# Patient Record
Sex: MALE | Race: WHITE | NOT HISPANIC OR LATINO | URBAN - METROPOLITAN AREA
[De-identification: names, ages, dates, MRNs, and addresses within clinical notes are randomized per-mention and may not be internally consistent; named-entity substitution may affect disease eponyms.]

---

## 2020-06-10 ENCOUNTER — EMERGENCY (EMERGENCY)
Facility: HOSPITAL | Age: 50
LOS: 0 days | Discharge: HOME | End: 2020-06-11
Attending: EMERGENCY MEDICINE
Payer: SELF-PAY

## 2020-06-10 PROCEDURE — 99053 MED SERV 10PM-8AM 24 HR FAC: CPT

## 2020-06-10 PROCEDURE — 29125 APPL SHORT ARM SPLINT STATIC: CPT

## 2020-06-10 PROCEDURE — 99284 EMERGENCY DEPT VISIT MOD MDM: CPT | Mod: 25

## 2020-06-11 VITALS
WEIGHT: 250 LBS | HEART RATE: 90 BPM | RESPIRATION RATE: 19 BRPM | HEIGHT: 66 IN | SYSTOLIC BLOOD PRESSURE: 153 MMHG | TEMPERATURE: 98 F | DIASTOLIC BLOOD PRESSURE: 113 MMHG

## 2020-06-11 VITALS
OXYGEN SATURATION: 97 % | HEART RATE: 75 BPM | SYSTOLIC BLOOD PRESSURE: 151 MMHG | RESPIRATION RATE: 19 BRPM | DIASTOLIC BLOOD PRESSURE: 82 MMHG | TEMPERATURE: 98 F

## 2020-06-11 LAB
ANION GAP SERPL CALC-SCNC: 7 MMOL/L — SIGNIFICANT CHANGE UP (ref 5–17)
APTT BLD: 32.4 SEC — SIGNIFICANT CHANGE UP (ref 27.5–36.3)
BASOPHILS # BLD AUTO: 0.01 K/UL — SIGNIFICANT CHANGE UP (ref 0–0.2)
BASOPHILS NFR BLD AUTO: 0.1 % — SIGNIFICANT CHANGE UP (ref 0–2)
BUN SERPL-MCNC: 11 MG/DL — SIGNIFICANT CHANGE UP (ref 7–23)
CALCIUM SERPL-MCNC: 8.7 MG/DL — SIGNIFICANT CHANGE UP (ref 8.5–10.1)
CHLORIDE SERPL-SCNC: 106 MMOL/L — SIGNIFICANT CHANGE UP (ref 96–108)
CO2 SERPL-SCNC: 26 MMOL/L — SIGNIFICANT CHANGE UP (ref 22–31)
CREAT SERPL-MCNC: 0.78 MG/DL — SIGNIFICANT CHANGE UP (ref 0.5–1.3)
EOSINOPHIL # BLD AUTO: 0.07 K/UL — SIGNIFICANT CHANGE UP (ref 0–0.5)
EOSINOPHIL NFR BLD AUTO: 0.8 % — SIGNIFICANT CHANGE UP (ref 0–6)
GLUCOSE SERPL-MCNC: 111 MG/DL — HIGH (ref 70–99)
HCT VFR BLD CALC: 45.1 % — SIGNIFICANT CHANGE UP (ref 39–50)
HGB BLD-MCNC: 15.3 G/DL — SIGNIFICANT CHANGE UP (ref 13–17)
IMM GRANULOCYTES NFR BLD AUTO: 0.3 % — SIGNIFICANT CHANGE UP (ref 0–1.5)
INR BLD: 1.09 RATIO — SIGNIFICANT CHANGE UP (ref 0.88–1.16)
LYMPHOCYTES # BLD AUTO: 1.32 K/UL — SIGNIFICANT CHANGE UP (ref 1–3.3)
LYMPHOCYTES # BLD AUTO: 15 % — SIGNIFICANT CHANGE UP (ref 13–44)
MCHC RBC-ENTMCNC: 31.1 PG — SIGNIFICANT CHANGE UP (ref 27–34)
MCHC RBC-ENTMCNC: 33.9 GM/DL — SIGNIFICANT CHANGE UP (ref 32–36)
MCV RBC AUTO: 91.7 FL — SIGNIFICANT CHANGE UP (ref 80–100)
MONOCYTES # BLD AUTO: 0.68 K/UL — SIGNIFICANT CHANGE UP (ref 0–0.9)
MONOCYTES NFR BLD AUTO: 7.7 % — SIGNIFICANT CHANGE UP (ref 2–14)
NEUTROPHILS # BLD AUTO: 6.71 K/UL — SIGNIFICANT CHANGE UP (ref 1.8–7.4)
NEUTROPHILS NFR BLD AUTO: 76.1 % — SIGNIFICANT CHANGE UP (ref 43–77)
NRBC # BLD: 0 /100 WBCS — SIGNIFICANT CHANGE UP (ref 0–0)
PLATELET # BLD AUTO: 239 K/UL — SIGNIFICANT CHANGE UP (ref 150–400)
POTASSIUM SERPL-MCNC: 3.6 MMOL/L — SIGNIFICANT CHANGE UP (ref 3.5–5.3)
POTASSIUM SERPL-SCNC: 3.6 MMOL/L — SIGNIFICANT CHANGE UP (ref 3.5–5.3)
PROTHROM AB SERPL-ACNC: 12.1 SEC — SIGNIFICANT CHANGE UP (ref 10–12.9)
RBC # BLD: 4.92 M/UL — SIGNIFICANT CHANGE UP (ref 4.2–5.8)
RBC # FLD: 13.7 % — SIGNIFICANT CHANGE UP (ref 10.3–14.5)
SODIUM SERPL-SCNC: 139 MMOL/L — SIGNIFICANT CHANGE UP (ref 135–145)
WBC # BLD: 8.82 K/UL — SIGNIFICANT CHANGE UP (ref 3.8–10.5)
WBC # FLD AUTO: 8.82 K/UL — SIGNIFICANT CHANGE UP (ref 3.8–10.5)

## 2020-06-11 PROCEDURE — 73562 X-RAY EXAM OF KNEE 3: CPT | Mod: 26,LT

## 2020-06-11 PROCEDURE — 73140 X-RAY EXAM OF FINGER(S): CPT | Mod: 26,RT

## 2020-06-11 RX ORDER — TETANUS TOXOID, REDUCED DIPHTHERIA TOXOID AND ACELLULAR PERTUSSIS VACCINE, ADSORBED 5; 2.5; 8; 8; 2.5 [IU]/.5ML; [IU]/.5ML; UG/.5ML; UG/.5ML; UG/.5ML
0.5 SUSPENSION INTRAMUSCULAR ONCE
Refills: 0 | Status: COMPLETED | OUTPATIENT
Start: 2020-06-11 | End: 2020-06-11

## 2020-06-11 RX ORDER — AMPICILLIN SODIUM AND SULBACTAM SODIUM 250; 125 MG/ML; MG/ML
3 INJECTION, POWDER, FOR SUSPENSION INTRAMUSCULAR; INTRAVENOUS ONCE
Refills: 0 | Status: COMPLETED | OUTPATIENT
Start: 2020-06-11 | End: 2020-06-11

## 2020-06-11 RX ORDER — OXYCODONE AND ACETAMINOPHEN 5; 325 MG/1; MG/1
1 TABLET ORAL ONCE
Refills: 0 | Status: DISCONTINUED | OUTPATIENT
Start: 2020-06-11 | End: 2020-06-11

## 2020-06-11 RX ADMIN — OXYCODONE AND ACETAMINOPHEN 1 TABLET(S): 5; 325 TABLET ORAL at 00:55

## 2020-06-11 RX ADMIN — AMPICILLIN SODIUM AND SULBACTAM SODIUM 200 GRAM(S): 250; 125 INJECTION, POWDER, FOR SUSPENSION INTRAMUSCULAR; INTRAVENOUS at 03:02

## 2020-06-11 RX ADMIN — TETANUS TOXOID, REDUCED DIPHTHERIA TOXOID AND ACELLULAR PERTUSSIS VACCINE, ADSORBED 0.5 MILLILITER(S): 5; 2.5; 8; 8; 2.5 SUSPENSION INTRAMUSCULAR at 00:54

## 2020-06-11 NOTE — ED ADULT NURSE NOTE - CAS EDN DISCHARGE ASSESSMENT
Dressing clean and dry/Symptoms improved/Alert and oriented to person, place and time/Neuro vascular intact post splinting

## 2020-06-11 NOTE — ED PROVIDER NOTE - PROGRESS NOTE DETAILS
Discussed w Dr. Duke, rec IV abx & will come see pt in am Notified unable to see pt in ED, rec splinting & outpt follow up.

## 2020-06-11 NOTE — ED PROVIDER NOTE - PATIENT PORTAL LINK FT
You can access the FollowMyHealth Patient Portal offered by [facility] by registering at the following website: [email or web address]. By joining [facility]’s FollowMyHealth portal, you will also be able to view your health information using other applications (apps) compatible with our system.

## 2020-06-11 NOTE — ED PROVIDER NOTE - OBJECTIVE STATEMENT
Pertinent PMH/PSH/FHx/SHx and Review of Systems contained within:    48yo M w no significant PMH presents to ED for eval of pain to his R index finger s/p fall.  Pt states he was moving a coffee table when he tripped and fell.  Pt states his R index finger was "crooked" and he landed on his L knee & L forearm.  Denies head trauma, LOC, neck pain, CP, SOB, dizziness.  Pt states his last tetanus shot about 20yrs ago.    No fever/chills, No photophobia/eye pain/changes in vision, No ear pain/sore throat/dysphagia, No chest pain/palpitations, no SOB/cough/wheeze/stridor, No abdominal pain, No neck/back pain, no rash, no changes in neurological status/function. Pertinent PMH/PSH/FHx/SHx and Review of Systems contained within:    48yo M w no significant PMH presents to ED for eval of pain to his R index finger s/p fall.  Pt states he was moving a coffee table when he tripped and fell.  Pt states his R index finger was "crooked" and he landed on his L knee & L forearm.  Denies head trauma, LOC, neck pain, CP, SOB, dizziness.  Pt states his last tetanus shot about 20yrs ago.  Pt is R handed.    No fever/chills, No photophobia/eye pain/changes in vision, No ear pain/sore throat/dysphagia, No chest pain/palpitations, no SOB/cough/wheeze/stridor, No abdominal pain, No neck/back pain, no rash, no changes in neurological status/function.

## 2020-06-11 NOTE — ED PROVIDER NOTE - PHYSICAL EXAMINATION
Gen: Alert, c/o pain  Head: NC, AT, EOMI, normal lids/conjunctiva  ENT: normal hearing, patent oropharynx, MMM  Neck: supple, no tenderness/meningismus/JVD, Trachea midline  Pulm: Bilateral clear BS, normal resp effort, no wheeze/stridor/retractions  CV: RRR, no M/R/G, +dist pulses  Abd: soft, NT/ND, +BS, no guarding/rebound tenderness  Mskel: no edema/erythema/cyanosis  Skin: no rash  Neuro: AAOx3, no sensory/motor deficits, CN 2-12 intact Gen: Alert, c/o pain  Head: NC, AT, EOMI, normal lids/conjunctiva  ENT: normal hearing, patent oropharynx, MMM  Neck: supple, no tenderness/meningismus/JVD, Trachea midline  Pulm: Bilateral clear BS, normal resp effort, no wheeze/stridor/retractions  CV: RRR, no M/R/G, +dist pulses  Abd: soft, NT/ND, +BS, no guarding/rebound tenderness  Mskel: R index finger swollen from MCP joint to PIP joint, + <1cm superficial laceration to dorsum of R index finger between PIP & MCP joint, sensation intact, cap refill <2sec, pt able to flex & extend at PIP joint, limited ROM at MCP joint due to pain, radial pulse +2; +erythema & contusion to L forearm, FROM in L forearm, radial pulse +2, sensation intact; +mild swelling of anterior medial knee, FROM, steady gait  Skin: no rash  Neuro: no sensory/motor deficits

## 2020-06-11 NOTE — ED ADULT NURSE REASSESSMENT NOTE - NS ED NURSE REASSESS COMMENT FT1
Pt with + fx, ERMD at bedside to dress wound. MD consulting with specialist, awaiting further orders

## 2020-06-11 NOTE — ED PROVIDER NOTE - NSFOLLOWUPINSTRUCTIONS_ED_ALL_ED_FT
please follow up with Dr. Duke    take antibiotics    take pain medication only when needed, be careful, it can make you dizzy and constipated    return to hospital if you feel worse

## 2020-06-11 NOTE — ED PROVIDER NOTE - NS HIV RISK FACTOR YES
H&P reviewed  After examining the patient I find no changes in the patients condition since the H&P had been written  Declined

## 2020-06-11 NOTE — ED PROVIDER NOTE - CARE PROVIDER_API CALL
Chriss Duke  PLASTIC SURGERY  [address] [place] [phone]  Phone: [phone]  Fax: [phone]  Follow Up Time:

## 2020-06-11 NOTE — ED PROVIDER NOTE - CLINICAL SUMMARY MEDICAL DECISION MAKING FREE TEXT BOX
Pt w fx of R index finger, pain improved w med given in ED.  Consulted hand, rec splinting & outpt follow up.  Splint placed, pt tolerated procedure well, remained NV intact.  Pt given first dose of abx in ED.  Dc home to  jacqueline Duke, instructed to return to ED if sx worsen.

## 2020-06-11 NOTE — ED ADULT NURSE REASSESSMENT NOTE - NS ED NURSE REASSESS COMMENT FT1
PIV dc'd after unasyn dose. right hand casted and acewrapped by provider, +CMST's to distal exposed digits. Provider reviewed dc instructions including need for f/u with hand specialist

## 2020-06-11 NOTE — ED PROVIDER NOTE - CARE PLAN
Principal Discharge DX:	Closed displaced fracture of proximal phalanx of right index finger, initial encounter

## 2020-06-11 NOTE — ED ADULT NURSE REASSESSMENT NOTE - NS ED NURSE REASSESS COMMENT FT1
Pt presents with right 2nd digit pain and swelling. Pt states he tripped and fell, abraision to left FA, landed on right hand splaying 2nd digit out. No LOC, no head injury. ERMD at bedside will follow
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
## 2020-06-12 DIAGNOSIS — S62.610A DISPLACED FRACTURE OF PROXIMAL PHALANX OF RIGHT INDEX FINGER, INITIAL ENCOUNTER FOR CLOSED FRACTURE: ICD-10-CM

## 2020-06-12 DIAGNOSIS — M79.644 PAIN IN RIGHT FINGER(S): ICD-10-CM
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
## 2023-02-23 NOTE — ED PROVIDER NOTE - CONDITION AT DISCHARGE:
Initiate Treatment: ketoconazole 2 % shampoo BIW\\nQuantity: 120.0 ml\\nSig: Apply shampoo to scalp let sit for 10-15 minutes then wash and rinse. Shampoo twice weekly to scalp.\\n\\nketoconazole 2 % topical cream \\nQuantity: 60.0 g  Days Supply: 30\\nSig: Apply to affected areas of face twice a day until resolved, then 1 week past clearance. Render In Strict Bullet Format?: No Detail Level: Simple Improved